# Patient Record
Sex: MALE | Race: BLACK OR AFRICAN AMERICAN | NOT HISPANIC OR LATINO | Employment: FULL TIME | URBAN - METROPOLITAN AREA
[De-identification: names, ages, dates, MRNs, and addresses within clinical notes are randomized per-mention and may not be internally consistent; named-entity substitution may affect disease eponyms.]

---

## 2019-04-09 ENCOUNTER — OCCUPATIONAL HEALTH (OUTPATIENT)
Dept: URGENT CARE | Facility: CLINIC | Age: 39
End: 2019-04-09

## 2022-07-21 ENCOUNTER — OCCUPATIONAL HEALTH (OUTPATIENT)
Dept: URGENT CARE | Facility: CLINIC | Age: 42
End: 2022-07-21

## 2022-07-21 DIAGNOSIS — Z02.1 PRE-EMPLOYMENT EXAMINATION: Primary | ICD-10-CM

## 2022-07-21 PROCEDURE — 99499 PHYSICAL, BASIC COMPLEXITY: ICD-10-PCS | Mod: S$GLB,,, | Performed by: NURSE PRACTITIONER

## 2022-07-21 PROCEDURE — 99499 UNLISTED E&M SERVICE: CPT | Mod: S$GLB,,, | Performed by: NURSE PRACTITIONER

## 2022-10-26 ENCOUNTER — OFFICE VISIT (OUTPATIENT)
Dept: URGENT CARE | Facility: CLINIC | Age: 42
End: 2022-10-26
Payer: OTHER MISCELLANEOUS

## 2022-10-26 VITALS
BODY MASS INDEX: 23.85 KG/M2 | HEART RATE: 56 BPM | HEIGHT: 69 IN | TEMPERATURE: 98 F | RESPIRATION RATE: 18 BRPM | WEIGHT: 161 LBS | DIASTOLIC BLOOD PRESSURE: 77 MMHG | SYSTOLIC BLOOD PRESSURE: 124 MMHG | OXYGEN SATURATION: 100 %
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
## 2022-10-26 DIAGNOSIS — S67.194A CRUSHING INJURY OF RIGHT RING FINGER, INITIAL ENCOUNTER: ICD-10-CM

## 2022-10-26 DIAGNOSIS — S61.314A LACERATION OF RIGHT RING FINGER WITHOUT FOREIGN BODY WITH DAMAGE TO NAIL, INITIAL ENCOUNTER: Primary | ICD-10-CM

## 2022-10-26 DIAGNOSIS — Z02.6 ENCOUNTER RELATED TO WORKER'S COMPENSATION CLAIM: ICD-10-CM

## 2022-10-26 PROCEDURE — 80305 OOH NON-DOT DRUG SCREEN: ICD-10-PCS | Mod: S$GLB,,, | Performed by: PHYSICIAN ASSISTANT

## 2022-10-26 PROCEDURE — 99203 OFFICE O/P NEW LOW 30 MIN: CPT | Mod: 25,S$GLB,, | Performed by: PHYSICIAN ASSISTANT

## 2022-10-26 PROCEDURE — 99203 PR OFFICE/OUTPT VISIT, NEW, LEVL III, 30-44 MIN: ICD-10-PCS | Mod: 25,S$GLB,, | Performed by: PHYSICIAN ASSISTANT

## 2022-10-26 PROCEDURE — 12041 LACERATION REPAIR: ICD-10-PCS | Mod: S$GLB,,, | Performed by: PHYSICIAN ASSISTANT

## 2022-10-26 PROCEDURE — 73140 X-RAY EXAM OF FINGER(S): CPT | Mod: RT,S$GLB,, | Performed by: RADIOLOGY

## 2022-10-26 PROCEDURE — 80305 DRUG TEST PRSMV DIR OPT OBS: CPT | Mod: S$GLB,,, | Performed by: PHYSICIAN ASSISTANT

## 2022-10-26 PROCEDURE — 12041 INTMD RPR N-HF/GENIT 2.5CM/<: CPT | Mod: S$GLB,,, | Performed by: PHYSICIAN ASSISTANT

## 2022-10-26 PROCEDURE — 73140 XR FINGER 2 OR MORE VIEWS: ICD-10-PCS | Mod: RT,S$GLB,, | Performed by: RADIOLOGY

## 2022-10-26 RX ORDER — ACETAMINOPHEN 500 MG
1000 TABLET ORAL EVERY 6 HOURS PRN
Qty: 60 TABLET | Refills: 0 | Status: SHIPPED | OUTPATIENT
Start: 2022-10-26 | End: 2023-10-26

## 2022-10-26 RX ORDER — HYDROCODONE BITARTRATE AND ACETAMINOPHEN 5; 325 MG/1; MG/1
1 TABLET ORAL EVERY 6 HOURS PRN
Qty: 10 TABLET | Refills: 0 | Status: SHIPPED | OUTPATIENT
Start: 2022-10-26

## 2022-10-26 RX ORDER — CEPHALEXIN 500 MG/1
500 CAPSULE ORAL EVERY 12 HOURS
Qty: 14 CAPSULE | Refills: 0 | Status: SHIPPED | OUTPATIENT
Start: 2022-10-26 | End: 2022-11-02

## 2022-10-26 RX ORDER — IBUPROFEN 800 MG/1
800 TABLET ORAL EVERY 6 HOURS PRN
Qty: 30 TABLET | Refills: 0 | Status: SHIPPED | OUTPATIENT
Start: 2022-10-26

## 2022-10-26 NOTE — PROGRESS NOTES
Subjective:       Patient ID: Virgilio Barrera is a 42 y.o. male.    Chief Complaint: Hand Pain (right)    New patient,New injury  (DOI) 10/26/22  [facility] DISPOSAL  he states he jam his finger in equipment at work. Half of his nail is off on his finger .LM    42-year-old RHD male presents with right ring finger injury after getting it jammed in piece of equipment at work.  This occurred just prior to arrival.  Reports damage to the nail however no amputation.  Denies previous right ring finger injury.  Patient is here with his wife.  He says his tetanus is up-to-date. MEB         Hand Injury   His dominant hand is their right hand. The incident occurred 1 to 3 hours ago. The incident occurred at work. The pain is present in the right hand. The quality of the pain is described as aching. The pain does not radiate. The pain is at a severity of 2/10. The pain is moderate. Pertinent negatives include no chest pain or numbness.     Constitution: Positive for activity change. Negative for chills and fever.   HENT:  Negative for facial trauma.    Neck: Negative for neck pain and neck stiffness.   Cardiovascular:  Negative for chest pain and passing out.   Eyes:  Negative for eye trauma and eye pain.   Respiratory:  Negative for shortness of breath and wheezing.    Gastrointestinal:  Negative for abdominal trauma, abdominal pain, nausea and vomiting.   Musculoskeletal:  Positive for pain and trauma. Negative for joint pain, joint swelling and abnormal ROM of joint.   Skin:  Positive for wound and laceration. Negative for abrasion, erythema and bruising.   Neurological:  Negative for light-headedness, numbness and tingling.   Hematologic/Lymphatic: Negative for easy bruising/bleeding. Does not bruise/bleed easily.   Psychiatric/Behavioral:  Negative for nervous/anxious. The patient is not nervous/anxious.       Objective:      Physical Exam  Vitals and nursing note reviewed.   Constitutional:       Appearance: He is  well-developed.   HENT:      Head: Normocephalic and atraumatic.      Right Ear: Hearing and external ear normal.      Left Ear: Hearing and external ear normal.      Nose: Nose normal.   Eyes:      General: Lids are normal.      Conjunctiva/sclera: Conjunctivae normal.   Neck:      Trachea: Trachea normal.   Cardiovascular:      Pulses: Normal pulses.   Pulmonary:      Effort: Pulmonary effort is normal. No respiratory distress.      Breath sounds: No stridor.   Musculoskeletal:         General: Normal range of motion.      Right hand: Laceration and tenderness present. Normal sensation. There is no disruption of two-point discrimination. Normal pulse.        Hands:       Comments: Transverse laceration of the nail with nail bed involvement of the right ring finger.  Extensors, FDS, FDP intact.  Finger neurovascularly intact.     Skin:     General: Skin is warm and dry.      Capillary Refill: Capillary refill takes less than 2 seconds.      Findings: No burn, erythema, lesion or rash.   Neurological:      Mental Status: He is alert. He is not disoriented.      GCS: GCS eye subscore is 4. GCS verbal subscore is 5. GCS motor subscore is 6.      Sensory: No sensory deficit.   Psychiatric:         Speech: Speech normal.         Behavior: Behavior normal.         X-Ray Finger 2 or More Views    Result Date: 10/26/2022  EXAMINATION: XR FINGER 2 OR MORE VIEWS CLINICAL HISTORY: TRAUMA right ring finger;  Crushing injury of right ring finger, initial encounter FINDINGS: Finger three views or more right. No fracture dislocation bone destruction seen.  No trauma seen.  There may be some small soft tissue foreign bodies under the nail bed.  This may be a compound fracture if there is a subungual hematoma or skin break. Electronically signed by: Estuardo Mix MD Date:    10/26/2022 Time:    15:01     Assessment:       1. Laceration of right ring finger without foreign body with damage to nail, initial encounter    2. Encounter  related to worker's compensation claim    3. Crushing injury of right ring finger, initial encounter    4. Work related injury        Plan:         Laceration Repair    Date/Time: 10/26/2022 1:25 PM  Performed by: John Azevedo PA-C  Authorized by: John Azevedo PA-C   Body area: upper extremity  Location details: right ring finger  Laceration length: 1 cm  Foreign bodies: no foreign bodies  Tendon involvement: none  Nerve involvement: none  Vascular damage: no  Anesthesia: digital block    Anesthesia:  Local Anesthetic: lidocaine 1% without epinephrine  Anesthetic total: 8 mL  Preparation: Patient was prepped and draped in the usual sterile fashion.  Irrigation solution: saline  Irrigation method: Soaked.  Amount of cleaning: extensive  Skin closure: 5-0 nylon  Number of sutures: 5  Technique: complex  Approximation: close  Approximation difficulty: complex  Dressing: pressure/compression dressing and splint for protection  Patient tolerance: Patient tolerated the procedure well with no immediate complications  Comments: Nailbed involved.  Removed distal half of the nail. No absorbable sutures available so I used 5-0 nylon to repair the nail bed.  After cleansing the distal half of the nail I secured it over the nail bed with four 4-0 nylon sutures.  Patient tolerated the procedure well.  Hemostasis achieved.  The wound was cleaned with soap and water and dressed with Telfa and Coban.  A finger guard splint was applied for protection.  Patient will follow-up in 2 days for a wound check.          Medications Ordered This Encounter   Medications    acetaminophen (TYLENOL EXTRA STRENGTH) 500 MG tablet     Sig: Take 2 tablets (1,000 mg total) by mouth every 6 (six) hours as needed for Pain.     Dispense:  60 tablet     Refill:  0    cephALEXin (KEFLEX) 500 MG capsule     Sig: Take 1 capsule (500 mg total) by mouth every 12 (twelve) hours. for 7 days     Dispense:  14 capsule     Refill:  0     HYDROcodone-acetaminophen (NORCO) 5-325 mg per tablet     Sig: Take 1 tablet by mouth every 6 (six) hours as needed for Pain (Take off duty only.).     Dispense:  10 tablet     Refill:  0     Quantity prescribed more than 7 day supply? No     Order Specific Question:   I have reviewed the Prescription Drug Monitoring Program (PDMP) database for this patient prior to prescribing the above opioid medication     Answer:   Yes    ibuprofen (ADVIL,MOTRIN) 800 MG tablet     Sig: Take 1 tablet (800 mg total) by mouth every 6 (six) hours as needed for Pain. Take with meals.     Dispense:  30 tablet     Refill:  0     Patient Instructions: Keep dressing clean/dry/covered, Use splint as directed   Restrictions:  (Avoid use of right hand.)  Follow up in about 2 days (around 10/28/2022) for wound check.

## 2022-10-26 NOTE — LETTER
[facility] - Occupational Health  [address] [address]  Phone: [phone]  Fax: [phone]  [facility]: [id number]    Pt Name: Virgilio Barrera  Injury Date: 10/26/2022   Employee ID: [id number] Date of First Treatment: 10/26/2022   Company: [employer]      Appointment Time: 01:26 PM Arrived: 1:26 PM   Provider: John Azevedo PA-C Time Out: 4:51 PM      Office Treatment:   1. Laceration of right ring finger without foreign body with damage to nail, initial encounter    2. Encounter related to worker's compensation claim    3. Crushing injury of right ring finger, initial encounter    4. Work related injury      Medications Ordered This Encounter   Medications    acetaminophen (TYLENOL EXTRA STRENGTH) 500 MG tablet    cephALEXin (KEFLEX) 500 MG capsule    HYDROcodone-acetaminophen (NORCO) 5-325 mg per tablet    ibuprofen (ADVIL,MOTRIN) 800 MG tablet      Patient Instructions: Keep dressing clean/dry/covered, Use splint as directed    Restrictions:  (Avoid use of right hand.)     Return Appointment: 10/28/2022 at 9:30 AM NILESH

## 2022-10-26 NOTE — PROCEDURES
Laceration Repair    Date/Time: 10/26/2022 1:25 PM  Performed by: John Azevedo PA-C  Authorized by: John Azevedo PA-C   Body area: upper extremity  Location details: right ring finger  Laceration length: 1 cm  Foreign bodies: no foreign bodies  Tendon involvement: none  Nerve involvement: none  Vascular damage: no  Anesthesia: digital block    Anesthesia:  Local Anesthetic: lidocaine 1% without epinephrine  Anesthetic total: 8 mL  Preparation: Patient was prepped and draped in the usual sterile fashion.  Irrigation solution: saline  Irrigation method: Soaked.  Amount of cleaning: extensive  Skin closure: 5-0 nylon  Number of sutures: 5  Technique: complex  Approximation: close  Approximation difficulty: complex  Dressing: pressure/compression dressing and splint for protection  Patient tolerance: Patient tolerated the procedure well with no immediate complications  Comments: Nailbed involved.  Removed distal half of the nail. No absorbable sutures available so I used 5-0 nylon to repair the nail bed.  After cleansing the distal half of the nail I secured it over the nail bed with four 4-0 nylon sutures.  Patient tolerated the procedure well.  Hemostasis achieved.  The wound was cleaned with soap and water and dressed with Telfa and Coban.  A finger guard splint was applied for protection.  Patient will follow-up in 2 days for a wound check.

## 2022-10-28 ENCOUNTER — OFFICE VISIT (OUTPATIENT)
Dept: URGENT CARE | Facility: CLINIC | Age: 42
End: 2022-10-28
Payer: OTHER MISCELLANEOUS

## 2022-10-28 VITALS
HEIGHT: 71 IN | BODY MASS INDEX: 25.2 KG/M2 | HEART RATE: 50 BPM | TEMPERATURE: 98 F | DIASTOLIC BLOOD PRESSURE: 84 MMHG | WEIGHT: 180 LBS | RESPIRATION RATE: 18 BRPM | OXYGEN SATURATION: 98 % | SYSTOLIC BLOOD PRESSURE: 130 MMHG

## 2022-10-28 DIAGNOSIS — S67.194D CRUSHING INJURY OF RIGHT RING FINGER, SUBSEQUENT ENCOUNTER: Primary | ICD-10-CM

## 2022-10-28 DIAGNOSIS — S61.314D LACERATION OF RIGHT RING FINGER WITHOUT FOREIGN BODY WITH DAMAGE TO NAIL, SUBSEQUENT ENCOUNTER: ICD-10-CM

## 2022-10-28 PROCEDURE — 99214 PR OFFICE/OUTPT VISIT, EST, LEVL IV, 30-39 MIN: ICD-10-PCS | Mod: S$GLB,,, | Performed by: EMERGENCY MEDICINE

## 2022-10-28 PROCEDURE — 99214 OFFICE O/P EST MOD 30 MIN: CPT | Mod: S$GLB,,, | Performed by: EMERGENCY MEDICINE

## 2022-10-28 NOTE — LETTER
[facility] - Occupational Health  [address] [address]  Phone: [phone]  Fax: [phone]  [facility]: [id number]    Pt Name: Virgilio Barrera  Injury Date: 10/26/2022   Employee ID:  Date of Treatment: 10/28/2022   Company: [employer]      Appointment Time: 09:15 AM Arrived: 10:25   Provider: Werner Gtz MD Time Out:11:53     Office Treatment:   1. Crushing injury of right ring finger, subsequent encounter    2. Laceration of right ring finger without foreign body with damage to nail, subsequent encounter          Patient Instructions: Attention not to aggravate affected area    Restrictions: Limited use of right hand and arm (NO USE OF RIGHT HAND AT WORK)     Return Appointment: 11/1/2022 at 2:30pm
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
## 2022-11-01 ENCOUNTER — OFFICE VISIT (OUTPATIENT)
Dept: URGENT CARE | Facility: CLINIC | Age: 42
End: 2022-11-01
Payer: OTHER MISCELLANEOUS

## 2022-11-01 VITALS
BODY MASS INDEX: 25.2 KG/M2 | TEMPERATURE: 98 F | DIASTOLIC BLOOD PRESSURE: 61 MMHG | WEIGHT: 180 LBS | RESPIRATION RATE: 18 BRPM | SYSTOLIC BLOOD PRESSURE: 104 MMHG | OXYGEN SATURATION: 100 % | HEIGHT: 71 IN | HEART RATE: 64 BPM

## 2022-11-01 DIAGNOSIS — S67.194D CRUSHING INJURY OF RIGHT RING FINGER, SUBSEQUENT ENCOUNTER: ICD-10-CM

## 2022-11-01 DIAGNOSIS — S61.314D LACERATION OF RIGHT RING FINGER WITHOUT FOREIGN BODY WITH DAMAGE TO NAIL, SUBSEQUENT ENCOUNTER: ICD-10-CM

## 2022-11-01 DIAGNOSIS — Z02.6 ENCOUNTER RELATED TO WORKER'S COMPENSATION CLAIM: Primary | ICD-10-CM

## 2022-11-01 PROCEDURE — 99214 OFFICE O/P EST MOD 30 MIN: CPT | Mod: S$GLB,,,

## 2022-11-01 PROCEDURE — 99214 PR OFFICE/OUTPT VISIT, EST, LEVL IV, 30-39 MIN: ICD-10-PCS | Mod: S$GLB,,,

## 2022-11-01 NOTE — LETTER
[facility] - Occupational Health  [address] [address]  Phone: [phone]  Fax: [phone]  [facility]: [id number]    Pt Name: Virgilio Barrera  Injury Date: 10/26/2022   Employee ID: [id number] Date of Treatment: 11/01/2022   Company: [employer]      Appointment Time: 02:30 PM Arrived: 2:30 PM    Provider: Jone Aquino, DO Time Out:4:03 PM     Office Treatment:   1. Encounter related to worker's compensation claim    2. Crushing injury of right ring finger, subsequent encounter    3. Laceration of right ring finger without foreign body with damage to nail, subsequent encounter               Restrictions:  (Avoid use of right hand)     Return Appointment: 11/3/2022 at 2:30 PM NILESH
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
## 2022-11-04 ENCOUNTER — OFFICE VISIT (OUTPATIENT)
Dept: URGENT CARE | Facility: CLINIC | Age: 42
End: 2022-11-04
Payer: OTHER MISCELLANEOUS

## 2022-11-04 VITALS
WEIGHT: 180 LBS | TEMPERATURE: 98 F | RESPIRATION RATE: 18 BRPM | HEART RATE: 64 BPM | OXYGEN SATURATION: 100 % | HEIGHT: 71 IN | SYSTOLIC BLOOD PRESSURE: 114 MMHG | DIASTOLIC BLOOD PRESSURE: 70 MMHG | BODY MASS INDEX: 25.2 KG/M2

## 2022-11-04 DIAGNOSIS — S67.194D CRUSHING INJURY OF RIGHT RING FINGER, SUBSEQUENT ENCOUNTER: ICD-10-CM

## 2022-11-04 DIAGNOSIS — Z02.6 ENCOUNTER RELATED TO WORKER'S COMPENSATION CLAIM: Primary | ICD-10-CM

## 2022-11-04 DIAGNOSIS — S61.314D LACERATION OF RIGHT RING FINGER WITHOUT FOREIGN BODY WITH DAMAGE TO NAIL, SUBSEQUENT ENCOUNTER: ICD-10-CM

## 2022-11-04 PROCEDURE — 99214 OFFICE O/P EST MOD 30 MIN: CPT | Mod: S$GLB,,,

## 2022-11-04 PROCEDURE — 99214 PR OFFICE/OUTPT VISIT, EST, LEVL IV, 30-39 MIN: ICD-10-PCS | Mod: S$GLB,,,

## 2022-11-04 NOTE — LETTER
[facility] - Occupational Health  [address] [address]  Phone: [phone]  Fax: [phone]  [facility]: [id number]    Pt Name: Virgilio Barrera  Injury Date: 10/26/2022   Employee ID: [id number] Date of Treatment: 11/04/2022   Company: [employer]      Appointment Time: 1:00pm Arrived: 1:24pm   Provider: Jone Aquino, DO Time Out: 3:40pm     Office Treatment:   1. Encounter related to worker's compensation claim    2. Crushing injury of right ring finger, subsequent encounter    3. Laceration of right ring finger without foreign body with damage to nail, subsequent encounter               Restrictions:  (Avoid use of right hand)     Return Appointment: 11/7/2022 at 3:30pm.  EC
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
## 2022-11-11 ENCOUNTER — OFFICE VISIT (OUTPATIENT)
Dept: URGENT CARE | Facility: CLINIC | Age: 42
End: 2022-11-11
Payer: OTHER MISCELLANEOUS

## 2022-11-11 VITALS
DIASTOLIC BLOOD PRESSURE: 73 MMHG | HEART RATE: 63 BPM | OXYGEN SATURATION: 98 % | SYSTOLIC BLOOD PRESSURE: 125 MMHG | TEMPERATURE: 99 F | RESPIRATION RATE: 16 BRPM | BODY MASS INDEX: 25.2 KG/M2 | HEIGHT: 71 IN | WEIGHT: 180 LBS

## 2022-11-11 DIAGNOSIS — Z02.6 ENCOUNTER RELATED TO WORKER'S COMPENSATION CLAIM: Primary | ICD-10-CM

## 2022-11-11 DIAGNOSIS — S67.194D CRUSHING INJURY OF RIGHT RING FINGER, SUBSEQUENT ENCOUNTER: ICD-10-CM

## 2022-11-11 DIAGNOSIS — S61.314D LACERATION OF RIGHT RING FINGER WITHOUT FOREIGN BODY WITH DAMAGE TO NAIL, SUBSEQUENT ENCOUNTER: ICD-10-CM

## 2022-11-11 PROCEDURE — 99214 PR OFFICE/OUTPT VISIT, EST, LEVL IV, 30-39 MIN: ICD-10-PCS | Mod: S$GLB,,,

## 2022-11-11 PROCEDURE — 99214 OFFICE O/P EST MOD 30 MIN: CPT | Mod: S$GLB,,,
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
## 2022-11-11 NOTE — LETTER
[facility] - Occupational Health  [address] [address]  Phone: [phone]  Fax: [phone]  [facility]: [email or web address]    Pt Name: Virgilio Barrera  Injury Date: 10/26/2022   Employee ID: [id number] Date of Treatment: 11/11/2022   Company: [employer]      Appointment Time: 11:00 AM Arrived: 11:10 AM   Provider: Jone Aquino, DO Time Out: 12:06 PM     Office Treatment:   1. Encounter related to worker's compensation claim    2. Crushing injury of right ring finger, subsequent encounter    3. Laceration of right ring finger without foreign body with damage to nail, subsequent encounter               Restrictions: Regular Duty     Return Appointment: 11/30/2022 at 10:30 AM NILESH

## 2022-11-30 ENCOUNTER — OFFICE VISIT (OUTPATIENT)
Dept: URGENT CARE | Facility: CLINIC | Age: 42
End: 2022-11-30
Payer: OTHER MISCELLANEOUS

## 2022-11-30 VITALS
DIASTOLIC BLOOD PRESSURE: 70 MMHG | RESPIRATION RATE: 18 BRPM | SYSTOLIC BLOOD PRESSURE: 117 MMHG | WEIGHT: 180 LBS | BODY MASS INDEX: 26.66 KG/M2 | TEMPERATURE: 99 F | OXYGEN SATURATION: 99 % | HEART RATE: 75 BPM | HEIGHT: 69 IN

## 2022-11-30 DIAGNOSIS — S61.314D LACERATION OF RIGHT RING FINGER WITHOUT FOREIGN BODY WITH DAMAGE TO NAIL, SUBSEQUENT ENCOUNTER: ICD-10-CM

## 2022-11-30 DIAGNOSIS — S67.194D CRUSHING INJURY OF RIGHT RING FINGER, SUBSEQUENT ENCOUNTER: Primary | ICD-10-CM

## 2022-11-30 PROCEDURE — 99214 OFFICE O/P EST MOD 30 MIN: CPT | Mod: S$GLB,,, | Performed by: EMERGENCY MEDICINE

## 2022-11-30 PROCEDURE — 99214 PR OFFICE/OUTPT VISIT, EST, LEVL IV, 30-39 MIN: ICD-10-PCS | Mod: S$GLB,,, | Performed by: EMERGENCY MEDICINE

## 2022-11-30 NOTE — LETTER
[facility] Occupational Health  [address] [address]  Phone: [phone]  Fax: [phone]  [facility]: [email or web address]    Pt Name: Virgilio Barrera  Injury Date: 10/26/2022   Employee ID:  Date of First Treatment: 11/30/2022   Company: [employer]      Appointment Time: 10:30 AM Arrived: 10:15 AM   Provider: Werner Gtz MD Time Out:11:10 AM     Office Treatment:   1. Crushing injury of right ring finger, subsequent encounter    2. Laceration of right ring finger without foreign body with damage to nail, subsequent encounter          Patient Instructions: Attention not to aggravate affected area      Restrictions: Regular Duty, Discharged from Occupational Health     SH
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
